# Patient Record
Sex: FEMALE | URBAN - METROPOLITAN AREA
[De-identification: names, ages, dates, MRNs, and addresses within clinical notes are randomized per-mention and may not be internally consistent; named-entity substitution may affect disease eponyms.]

---

## 2017-02-07 ENCOUNTER — APPOINTMENT (RX ONLY)
Dept: URBAN - METROPOLITAN AREA CLINIC 29 | Facility: CLINIC | Age: 27
Setting detail: DERMATOLOGY
End: 2017-02-07

## 2017-02-07 VITALS — WEIGHT: 159 LBS | HEIGHT: 65 IN

## 2017-02-07 DIAGNOSIS — Z41.1 ENCOUNTER FOR COSMETIC SURGERY: ICD-10-CM

## 2017-02-07 PROCEDURE — ? PRE-OP WORKLIST

## 2017-02-07 PROCEDURE — ? RECOMMENDATIONS

## 2017-02-07 NOTE — PROCEDURE: PRE-OP WORKLIST
Photos Taken?: yes
Date Of Surgery: 02/08/17
Surgery Scheduled: gluteal implant exchange from 712cc to 548cc
Admission Status: outpatient
Surgeon: Alvaro Montano MD
Detail Level: Simple

## 2017-02-07 NOTE — PROCEDURE: RECOMMENDATIONS
Detail Level: Zone
Recommendation Preamble: The following recommendations were made during the visit:
Recommendations (Free Text): 1. gluteal implant exchange for 548cc silicone.\\n\\n-patient reports she will fly out Saturday. I advised the patient that I strongly [place] 1 week f/u. She was advised multiple times prior to the procedure she was required to remain [place] until her 1 week f/u.\\n\\n-i advised patient to refrain from sitting at all for the 6week period (use BBL pillow sparingly)

## 2017-02-07 NOTE — HPI: PREOPERATIVE APPOINTMENT
Has The Patient Completed Informed Consent?: is scheduled to complete informed consent documentation during this visit
Date Of Procedure?: 02/08/17
Facility: [facility]
Surgeon: ISABELLA

## 2017-02-09 ENCOUNTER — APPOINTMENT (RX ONLY)
Dept: URBAN - METROPOLITAN AREA CLINIC 5 | Facility: CLINIC | Age: 27
Setting detail: DERMATOLOGY
End: 2017-02-09

## 2017-02-09 DIAGNOSIS — Z48.89 ENCOUNTER FOR OTHER SPECIFIED SURGICAL AFTERCARE: ICD-10-CM

## 2017-02-09 PROCEDURE — ? COUNSELING - POST-OP CHECK, BODY CONTOURING

## 2017-02-09 PROCEDURE — ? PATIENT SPECIFIC COUNSELING

## 2017-02-09 ASSESSMENT — LOCATION SIMPLE DESCRIPTION DERM
LOCATION SIMPLE: LEFT BUTTOCK
LOCATION SIMPLE: RIGHT BUTTOCK
LOCATION SIMPLE: GLUTEAL CLEFT

## 2017-02-09 ASSESSMENT — LOCATION DETAILED DESCRIPTION DERM
LOCATION DETAILED: RIGHT BUTTOCK
LOCATION DETAILED: LEFT BUTTOCK
LOCATION DETAILED: GLUTEAL CLEFT

## 2017-02-09 ASSESSMENT — LOCATION ZONE DERM: LOCATION ZONE: TRUNK

## 2017-02-09 NOTE — PROCEDURE: PATIENT SPECIFIC COUNSELING
Detail Level: Zone
Other (Free Text): -patient flying out this Saturday against my recommendation and contrary to what patient agreed to prior to surgery. \\n-early boarding letter provided.\\n-written rx for percocet provided. pt informed she must fill RX in [place].